# Patient Record
Sex: FEMALE | HISPANIC OR LATINO
[De-identification: names, ages, dates, MRNs, and addresses within clinical notes are randomized per-mention and may not be internally consistent; named-entity substitution may affect disease eponyms.]

---

## 2018-01-08 ENCOUNTER — FORM ENCOUNTER (OUTPATIENT)
Age: 58
End: 2018-01-08

## 2018-01-24 ENCOUNTER — FORM ENCOUNTER (OUTPATIENT)
Age: 58
End: 2018-01-24

## 2019-01-08 ENCOUNTER — FORM ENCOUNTER (OUTPATIENT)
Age: 59
End: 2019-01-08

## 2019-02-27 ENCOUNTER — FORM ENCOUNTER (OUTPATIENT)
Age: 59
End: 2019-02-27

## 2019-11-02 ENCOUNTER — FORM ENCOUNTER (OUTPATIENT)
Age: 59
End: 2019-11-02

## 2020-01-27 ENCOUNTER — FORM ENCOUNTER (OUTPATIENT)
Age: 60
End: 2020-01-27

## 2021-02-15 PROBLEM — L80 VITILIGO: Status: RESOLVED | Noted: 2021-02-15 | Resolved: 2021-02-15

## 2021-02-15 PROBLEM — Z86.39 HISTORY OF HYPERCHOLESTEROLEMIA: Status: RESOLVED | Noted: 2021-02-15 | Resolved: 2021-02-15

## 2021-02-15 PROBLEM — Z87.09 HISTORY OF ASTHMA: Status: RESOLVED | Noted: 2021-02-15 | Resolved: 2021-02-15

## 2021-02-15 PROBLEM — Z80.3 FAMILY HISTORY OF BREAST CANCER: Status: ACTIVE | Noted: 2021-02-15

## 2021-02-15 PROBLEM — Z87.19 HISTORY OF GASTROESOPHAGEAL REFLUX (GERD): Status: RESOLVED | Noted: 2021-02-15 | Resolved: 2021-02-15

## 2021-02-16 ENCOUNTER — APPOINTMENT (OUTPATIENT)
Dept: BREAST CENTER | Facility: CLINIC | Age: 61
End: 2021-02-16
Payer: COMMERCIAL

## 2021-02-16 VITALS
HEART RATE: 61 BPM | DIASTOLIC BLOOD PRESSURE: 82 MMHG | BODY MASS INDEX: 34.31 KG/M2 | WEIGHT: 201 LBS | SYSTOLIC BLOOD PRESSURE: 149 MMHG | HEIGHT: 64 IN

## 2021-02-16 DIAGNOSIS — Z80.3 FAMILY HISTORY OF MALIGNANT NEOPLASM OF BREAST: ICD-10-CM

## 2021-02-16 DIAGNOSIS — Z87.09 PERSONAL HISTORY OF OTHER DISEASES OF THE RESPIRATORY SYSTEM: ICD-10-CM

## 2021-02-16 DIAGNOSIS — Z78.9 OTHER SPECIFIED HEALTH STATUS: ICD-10-CM

## 2021-02-16 DIAGNOSIS — N64.9 DISORDER OF BREAST, UNSPECIFIED: ICD-10-CM

## 2021-02-16 DIAGNOSIS — L80 VITILIGO: ICD-10-CM

## 2021-02-16 DIAGNOSIS — Z86.39 PERSONAL HISTORY OF OTHER ENDOCRINE, NUTRITIONAL AND METABOLIC DISEASE: ICD-10-CM

## 2021-02-16 DIAGNOSIS — Z87.19 PERSONAL HISTORY OF OTHER DISEASES OF THE DIGESTIVE SYSTEM: ICD-10-CM

## 2021-02-16 PROCEDURE — 99213 OFFICE O/P EST LOW 20 MIN: CPT

## 2021-02-16 PROCEDURE — 99072 ADDL SUPL MATRL&STAF TM PHE: CPT

## 2021-02-17 ENCOUNTER — NON-APPOINTMENT (OUTPATIENT)
Age: 61
End: 2021-02-17

## 2021-02-17 PROBLEM — N64.9 SKIN LESION OF BREAST: Status: ACTIVE | Noted: 2021-02-17

## 2021-02-17 PROBLEM — Z78.9 CURRENT NON-DRINKER OF ALCOHOL: Status: ACTIVE | Noted: 2021-02-16

## 2021-02-17 RX ORDER — ALPRAZOLAM 0.5 MG/1
0.5 TABLET ORAL
Qty: 45 | Refills: 0 | Status: ACTIVE | COMMUNITY
Start: 2021-01-29

## 2021-02-17 RX ORDER — CLOBETASOL PROPIONATE 0.5 MG/G
0.05 CREAM TOPICAL
Qty: 15 | Refills: 0 | Status: ACTIVE | COMMUNITY
Start: 2020-11-25

## 2021-02-17 RX ORDER — CYCLOBENZAPRINE HYDROCHLORIDE 5 MG/1
5 TABLET, FILM COATED ORAL
Qty: 30 | Refills: 0 | Status: ACTIVE | COMMUNITY
Start: 2021-01-29

## 2021-02-17 RX ORDER — PANTOPRAZOLE 40 MG/1
40 TABLET, DELAYED RELEASE ORAL
Qty: 90 | Refills: 0 | Status: ACTIVE | COMMUNITY
Start: 2020-06-01

## 2021-02-17 RX ORDER — ALBUTEROL SULFATE 90 UG/1
108 (90 BASE) INHALANT RESPIRATORY (INHALATION)
Qty: 8 | Refills: 0 | Status: ACTIVE | COMMUNITY
Start: 2021-01-18

## 2021-02-17 RX ORDER — HYDROCORTISONE 0.5 G/100G
0.5 CREAM TOPICAL 3 TIMES DAILY
Qty: 1 | Refills: 0 | Status: ACTIVE | COMMUNITY
Start: 2021-02-17 | End: 1900-01-01

## 2021-02-17 RX ORDER — OLMESARTAN MEDOXOMIL 40 MG/1
40 TABLET, FILM COATED ORAL
Qty: 90 | Refills: 0 | Status: ACTIVE | COMMUNITY
Start: 2020-09-28

## 2021-02-17 RX ORDER — TIOTROPIUM BROMIDE INHALATION SPRAY 3.12 UG/1
2.5 SPRAY, METERED RESPIRATORY (INHALATION)
Qty: 4 | Refills: 0 | Status: ACTIVE | COMMUNITY
Start: 2021-01-18

## 2021-02-17 RX ORDER — SIMVASTATIN 20 MG/1
20 TABLET, FILM COATED ORAL
Qty: 90 | Refills: 0 | Status: ACTIVE | COMMUNITY
Start: 2020-12-22

## 2021-02-23 NOTE — PHYSICAL EXAM
[Supple] : supple [No Supraclavicular Adenopathy] : no supraclavicular adenopathy [No Cervical Adenopathy] : no cervical adenopathy [Examined in the supine and seated position] : examined in the supine and seated position [No dominant masses] : no dominant masses in right breast  [No dominant masses] : no dominant masses left breast [No Nipple Retraction] : no right nipple retraction [No Nipple Discharge] : no right nipple discharge [No Axillary Lymphadenopathy] : no left axillary lymphadenopathy [de-identified] : areolar Cho gland inflammation which patient is applying Bacitracin to with no relief [de-identified] : well healed L TM w/ TRAM reconstruction

## 2021-02-23 NOTE — PAST MEDICAL HISTORY
[Menarche Age ____] : age at menarche was [unfilled] [Menopause Age____] : age at menopause was [unfilled] [Total Preg ___] : G[unfilled] [Live Births ___] : P[unfilled]  [Age At Live Birth ___] : Age at live birth: [unfilled] [de-identified] : menopause d/t chemo [FreeTextEntry6] : NO [FreeTextEntry7] : YES  [FreeTextEntry8] : YES

## 2021-02-23 NOTE — HISTORY OF PRESENT ILLNESS
[FreeTextEntry1] : 61yo female previously seen by Dr. 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 Gaines. Patient presents for breast cancer surveillance and denies palpable masses, skin changes, or nipple discharge.\par \par \par

## 2021-02-23 NOTE — DATA REVIEWED
[FreeTextEntry1] : 1/9/18: R MG: heterogeneously dense, post-op changes and three tissue markers, stable benign calcs. BIRADS 2.\par \par 1/9/19: R MG: heterogeneously dense, few stable benign calcs, three tissue markers in place. BIRADS 2. \par \par 1/28/20: R MG: heterogeneously dense, post-op changes, three tissue markers in place, scattered benign calcs. BIRADS 2. \par \par 2/16/20: R MG: heterogeneously dense, R - post-op changes UOQ, numerous benign scattered microcalcs as well as two clusters of microcalcs UOQ 12:00 - both clusters are adjacent to tissue markers and have not increased compared to prior studies but have coarsened and appear slightly different. BIRADS 2 - 6mo f/u RDXMG rec

## 2021-08-16 DIAGNOSIS — N63.0 UNSPECIFIED LUMP IN UNSPECIFIED BREAST: ICD-10-CM

## 2021-08-16 DIAGNOSIS — R92.1 MAMMOGRAPHIC CALCIFICATION FOUND ON DIAGNOSTIC IMAGING OF BREAST: ICD-10-CM

## 2021-08-16 PROBLEM — Z12.39 SCREENING BREAST EXAMINATION: Status: ACTIVE | Noted: 2021-08-16

## 2021-08-16 PROBLEM — N64.9 DISORDER OF BREAST, UNSPECIFIED: Status: ACTIVE | Noted: 2021-08-16

## 2021-08-16 PROBLEM — Z85.3 HISTORY OF MALIGNANT NEOPLASM OF FEMALE BREAST: Status: RESOLVED | Noted: 2021-08-16 | Resolved: 2021-08-16

## 2021-08-17 ENCOUNTER — APPOINTMENT (OUTPATIENT)
Dept: BREAST CENTER | Facility: CLINIC | Age: 61
End: 2021-08-17
Payer: COMMERCIAL

## 2021-08-17 VITALS
DIASTOLIC BLOOD PRESSURE: 80 MMHG | SYSTOLIC BLOOD PRESSURE: 134 MMHG | WEIGHT: 202 LBS | HEART RATE: 54 BPM | BODY MASS INDEX: 34.49 KG/M2 | HEIGHT: 64 IN | OXYGEN SATURATION: 96 %

## 2021-08-17 DIAGNOSIS — Z00.00 ENCOUNTER FOR GENERAL ADULT MEDICAL EXAMINATION W/OUT ABNORMAL FINDINGS: ICD-10-CM

## 2021-08-17 DIAGNOSIS — Z85.3 PERSONAL HISTORY OF MALIGNANT NEOPLASM OF BREAST: ICD-10-CM

## 2021-08-17 DIAGNOSIS — N64.9 DISORDER OF BREAST, UNSPECIFIED: ICD-10-CM

## 2021-08-17 DIAGNOSIS — Z12.39 ENCOUNTER FOR OTHER SCREENING FOR MALIGNANT NEOPLASM OF BREAST: ICD-10-CM

## 2021-08-17 PROCEDURE — 99213 OFFICE O/P EST LOW 20 MIN: CPT

## 2021-08-18 PROBLEM — Z00.00 ENCOUNTER FOR PREVENTIVE HEALTH EXAMINATION: Status: ACTIVE | Noted: 2021-01-07

## 2021-08-22 NOTE — ASSESSMENT
[FreeTextEntry1] : 61yo female (former pt of Dr. 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 Gaines. Her most recent R mammo/US (8/17/21) is negative with regular breast exam.

## 2021-08-22 NOTE — PAST MEDICAL HISTORY
[Menarche Age ____] : age at menarche was [unfilled] [Menopause Age____] : age at menopause was [unfilled] [Total Preg ___] : G[unfilled] [Live Births ___] : P[unfilled]  [Age At Live Birth ___] : Age at live birth: [unfilled] [de-identified] : menopause d/t chemo [FreeTextEntry6] : NO [FreeTextEntry7] : YES  [FreeTextEntry8] : YES

## 2021-08-22 NOTE — PHYSICAL EXAM
[Supple] : supple [No Supraclavicular Adenopathy] : no supraclavicular adenopathy [No Cervical Adenopathy] : no cervical adenopathy [Examined in the supine and seated position] : examined in the supine and seated position [No dominant masses] : no dominant masses in right breast  [No dominant masses] : no dominant masses left breast [No Nipple Retraction] : no right nipple retraction [No Nipple Discharge] : no right nipple discharge [No Axillary Lymphadenopathy] : no right axillary lymphadenopathy [de-identified] : well healed L TM w/ TRAM reconstruction

## 2021-08-22 NOTE — HISTORY OF PRESENT ILLNESS
[FreeTextEntry1] : 61yo female (former pt of Dr. 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 Gaines. Her most recent R mammo (2/16/21) is BI-RADS 2 for slightly diff appearing microcalcs. Her most recent right diag mammo/US was negative with benign cysts and recommend MRI due to her premenopausal CA. \par \par Patient presents for breast cancer surveillance and denies palpable masses, skin changes, or nipple discharge.\par \par \par

## 2021-08-22 NOTE — DATA REVIEWED
[FreeTextEntry1] : 1/9/18: R MG: heterogeneously dense, post-op changes and three tissue markers, stable benign calcs. BIRADS 2.\par \par 1/9/19: R MG: heterogeneously dense, few stable benign calcs, three tissue markers in place. BIRADS 2. \par \par 1/28/20: R MG: heterogeneously dense, post-op changes, three tissue markers in place, scattered benign calcs. BIRADS 2. \par \par 2/16/20: R MG: heterogeneously dense, R - post-op changes UOQ, numerous benign scattered microcalcs as well as two clusters of microcalcs UOQ 12:00 - both clusters are adjacent to tissue markers and have not increased compared to prior studies but have coarsened and appear slightly different. BIRADS 2 - 6mo f/u RDXMG rec\par \par 2/16/21: R screening mammo (BI-RADS 2) microcalcs appear slightly diff but more numerous- R diag mammo in 6 months \par \par 8/17/21 ([facility]) R diag mammo/US BI-RADS 2- two 3mm cysts with post surgical architectural distortion (hetero dense)

## 2021-08-31 ENCOUNTER — TRANSCRIPTION ENCOUNTER (OUTPATIENT)
Age: 61
End: 2021-08-31

## 2021-09-01 ENCOUNTER — NON-APPOINTMENT (OUTPATIENT)
Age: 61
End: 2021-09-01

## 2022-07-25 ENCOUNTER — NON-APPOINTMENT (OUTPATIENT)
Age: 62
End: 2022-07-25

## 2022-08-02 ENCOUNTER — APPOINTMENT (OUTPATIENT)
Dept: BREAST CENTER | Facility: CLINIC | Age: 62
End: 2022-08-02

## 2022-08-02 VITALS
HEIGHT: 64 IN | BODY MASS INDEX: 35.06 KG/M2 | WEIGHT: 205.38 LBS | SYSTOLIC BLOOD PRESSURE: 151 MMHG | DIASTOLIC BLOOD PRESSURE: 84 MMHG | HEART RATE: 60 BPM

## 2022-08-02 DIAGNOSIS — Z91.89 OTHER SPECIFIED PERSONAL RISK FACTORS, NOT ELSEWHERE CLASSIFIED: ICD-10-CM

## 2022-08-02 DIAGNOSIS — Z78.9 OTHER SPECIFIED HEALTH STATUS: ICD-10-CM

## 2022-08-02 PROCEDURE — 99213 OFFICE O/P EST LOW 20 MIN: CPT

## 2022-08-10 NOTE — ASSESSMENT
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 Gaines. Her  right diag mammo/US was negative with benign cysts and recommend MRI due to her premenopausal CA with recurrence, MRI 8/26/21 BIRADS 2. \par \par Patient with [facility] in 2018 negative for pathogenic mutations, VUS in NF1. Copy of report provided to patient per patient request. Additionally, enrolled patient in [employer] study, consent obtained and a copy given to patient.\par \par Patient without complaint. Physical exam WNL. Reviewed most recent imaging, R sMMG/US 8/2/22, BIRADS-2 Plan for MRI and re-examination with AGUSTO Elder or AGUSTO Angelo in 6 months. At that time, if imaging and exam benign, plan for R sMMG/US and reexamination 6 months later. Patient verbalizes understanding and is in agreement with the plan. \par

## 2022-08-10 NOTE — DATA REVIEWED
[FreeTextEntry1] : 1/9/18: R MG: heterogeneously dense, post-op changes and three tissue markers, stable benign calcs. BIRADS 2.\par \par 1/9/19: R MG: heterogeneously dense, few stable benign calcs, three tissue markers in place. BIRADS 2. \par \par 1/28/20: R MG: heterogeneously dense, post-op changes, three tissue markers in place, scattered benign calcs. BIRADS 2. \par \par 2/16/20: R MG: heterogeneously dense, R - post-op changes UOQ, numerous benign scattered microcalcs as well as two clusters of microcalcs UOQ 12:00 - both clusters are adjacent to tissue markers and have not increased compared to prior studies but have coarsened and appear slightly different. BIRADS 2 - 6mo f/u RDXMG rec\par \par 2/16/21: R screening mammo (BI-RADS 2) microcalcs appear slightly diff but more numerous- R diag mammo in 6 months \par \par 8/17/21 ([facility]) R diag mammo/US BI-RADS 2- two 3mm cysts with post surgical architectural distortion (hetero dense)\par \par 8/26/22 B/L MRI [facility]): No MR evidence of malignancy bilaterally in this patient status post left breast with TRAM flap reconstruction. Follow up annual screening. BIRADS 2 - Benign. \par \par 8/2/22 ([facility]) R sMMG/R US: heterogeneously dense. There is stable postoperative spiculated architectural distortion in the upper outer right breast. There are stable clusters of benign microcalcifications, two of the clusters are marked by clips. The scar is noted as well as two tiny cysts measuring 4 mm and 3 mm cyst. No mammographic or US evidence of malignancy. FOLLOW-UP:  Annual screening. BI-RADS 2:  Benign.

## 2022-08-10 NOTE — HISTORY OF PRESENT ILLNESS
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 Gaines s/p chemo in [place], [place] with Dr. Monteiro. Her right diag mammo/US in 8/17/21 was negative with benign cysts and recommend MRI due to her premenopausal CA with recurrence, MRI 8/26/21 BIRADS 2. Most recent imaging, R sMMG/US performed today, BIRADS-2. Denies palpable masses, skin changes, or nipple discharge. Of note, former pt of Dr. Alcazar.\par \par Patient with [facility] in 2018 negative for pathogenic mutations, VUS in NF1. \par \par \par \par

## 2022-08-10 NOTE — PAST MEDICAL HISTORY
[History of Hormone Replacement Treatment] : has no history of hormone replacement treatment [de-identified] : menopause d/t chemo [FreeTextEntry6] : NO [FreeTextEntry7] : YES  [FreeTextEntry8] : YES

## 2023-02-03 PROBLEM — Z08 ENCOUNTER FOR FOLLOW-UP SURVEILLANCE OF BREAST CANCER: Status: ACTIVE | Noted: 2022-07-25

## 2023-02-03 PROBLEM — Z85.3 PERSONAL HISTORY OF BREAST CANCER: Status: ACTIVE | Noted: 2021-02-15

## 2023-02-06 ENCOUNTER — APPOINTMENT (OUTPATIENT)
Dept: BREAST CENTER | Facility: CLINIC | Age: 63
End: 2023-02-06
Payer: COMMERCIAL

## 2023-02-06 VITALS
HEART RATE: 65 BPM | BODY MASS INDEX: 35 KG/M2 | SYSTOLIC BLOOD PRESSURE: 123 MMHG | WEIGHT: 205 LBS | DIASTOLIC BLOOD PRESSURE: 74 MMHG | HEIGHT: 64 IN

## 2023-02-06 DIAGNOSIS — Z85.3 PERSONAL HISTORY OF MALIGNANT NEOPLASM OF BREAST: ICD-10-CM

## 2023-02-06 DIAGNOSIS — Z08 ENCOUNTER FOR FOLLOW-UP EXAMINATION AFTER COMPLETED TREATMENT FOR MALIGNANT NEOPLASM: ICD-10-CM

## 2023-02-06 DIAGNOSIS — Z85.3 ENCOUNTER FOR FOLLOW-UP EXAMINATION AFTER COMPLETED TREATMENT FOR MALIGNANT NEOPLASM: ICD-10-CM

## 2023-02-06 PROCEDURE — 99213 OFFICE O/P EST LOW 20 MIN: CPT

## 2023-02-06 NOTE — PHYSICAL EXAM
[Supple] : supple [No Supraclavicular Adenopathy] : no supraclavicular adenopathy [No Cervical Adenopathy] : no cervical adenopathy [Examined in the supine and seated position] : examined in the supine and seated position [No dominant masses] : no dominant masses in right breast  [No dominant masses] : no dominant masses left breast [No Nipple Retraction] : no right nipple retraction [No Nipple Discharge] : no right nipple discharge [No Axillary Lymphadenopathy] : no left axillary lymphadenopathy [de-identified] : well healed L TM w/ TRAM reconstruction

## 2023-02-06 NOTE — DATA REVIEWED
[FreeTextEntry1] : 1/9/18: R MG: heterogeneously dense, post-op changes and three tissue markers, stable benign calcs. BIRADS 2.\par \par 1/9/19: R MG: heterogeneously dense, few stable benign calcs, three tissue markers in place. BIRADS 2. \par \par 1/28/20: R MG: heterogeneously dense, post-op changes, three tissue markers in place, scattered benign calcs. BIRADS 2. \par \par 2/16/20: R MG: heterogeneously dense, R - post-op changes UOQ, numerous benign scattered microcalcs as well as two clusters of microcalcs UOQ 12:00 - both clusters are adjacent to tissue markers and have not increased compared to prior studies but have coarsened and appear slightly different. BIRADS 2 - 6mo f/u RDXMG rec\par \par 2/16/21: R screening mammo (BI-RADS 2) microcalcs appear slightly diff but more numerous- R diag mammo in 6 months \par \par 8/17/21 ([facility]) R diag mammo/US BI-RADS 2- two 3mm cysts with post surgical architectural distortion (hetero dense)\par \par 8/26/22 B/L MRI ([facility]): No MR evidence of malignancy bilaterally in this patient status post left breast with TRAM flap reconstruction. Follow up annual screening. BIRADS 2 - Benign. \par \par 8/2/22 (R) R sMMG/R US: heterogeneously dense. There is stable postoperative spiculated architectural distortion in the upper outer right breast. There are stable clusters of benign microcalcifications, two of the clusters are marked by clips. The scar is noted as well as two tiny cysts measuring 4 mm and 3 mm cyst. No mammographic or US evidence of malignancy. FOLLOW-UP:  Annual screening. BI-RADS 2:  Benign. \par \par 2/6/23 (R) B/L MRI: \par LEFT BREAST: The patient is status post left mastectomy with TRAM flap reconstruction. RIGHT BREAST: There are abundant enhancing foci throughout the right breast. There are no enhancing masses nor areas of non-mass enhancement to suggest malignancy. There is non-enhancing postoperative architectural distortion in the right UOQ. Tissue markers are seen centrally and laterally in the right breast. No MR evidence of malignancy.  FOLLOW-UP:  Annual screening. BI-RADS Category 2:  Benign.

## 2023-02-06 NOTE — ASSESSMENT
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 Gaines. [facility] in 2018 negative for pathogenic mutations, VUS in NF1.\par \par Patient without complaint. Physical exam WNL. Reviewed most recent imaging, B/L MRI BIRADS-2. Plan for R sMMG/US and reexamination in 6 months. Patient verbalizes understanding and is in agreement with the plan. \par

## 2023-02-06 NOTE — HISTORY OF PRESENT ILLNESS
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 Gaines s/p chemo in [place], [place] with Dr. Monteiro. Of note, former pt of Dr. Alcazar.\par \par R sMMG/US 8/2/22, BIRADS-2. B/L MRI performed today, BIRADS-2.  \par Denies palpable masses, skin changes, or nipple discharge. \par \par Patient with [facility] in 2018 negative for pathogenic mutations, VUS in NF1. Patient enrolled in [employer] research study.\par \par \par

## 2023-02-06 NOTE — PAST MEDICAL HISTORY
[Menarche Age ____] : age at menarche was [unfilled] [Menopause Age____] : age at menopause was [unfilled] [Total Preg ___] : G[unfilled] [Live Births ___] : P[unfilled]  [Age At Live Birth ___] : Age at live birth: [unfilled] [History of Hormone Replacement Treatment] : has no history of hormone replacement treatment [de-identified] : menopause d/t chemo [FreeTextEntry6] : NO [FreeTextEntry7] : YES  [FreeTextEntry8] : YES

## 2023-03-08 ENCOUNTER — NON-APPOINTMENT (OUTPATIENT)
Age: 63
End: 2023-03-08

## 2023-07-11 ENCOUNTER — APPOINTMENT (OUTPATIENT)
Dept: PAIN MANAGEMENT | Facility: CLINIC | Age: 63
End: 2023-07-11
Payer: COMMERCIAL

## 2023-07-11 VITALS
HEIGHT: 64 IN | OXYGEN SATURATION: 96 % | SYSTOLIC BLOOD PRESSURE: 155 MMHG | WEIGHT: 207 LBS | BODY MASS INDEX: 35.34 KG/M2 | HEART RATE: 54 BPM | DIASTOLIC BLOOD PRESSURE: 92 MMHG

## 2023-07-11 DIAGNOSIS — M79.10 MYALGIA, UNSPECIFIED SITE: ICD-10-CM

## 2023-07-11 DIAGNOSIS — G89.29 PAIN IN RIGHT SHOULDER: ICD-10-CM

## 2023-07-11 DIAGNOSIS — Z87.39 PERSONAL HISTORY OF OTHER DISEASES OF THE MUSCULOSKELETAL SYSTEM AND CONNECTIVE TISSUE: ICD-10-CM

## 2023-07-11 DIAGNOSIS — M54.16 RADICULOPATHY, LUMBAR REGION: ICD-10-CM

## 2023-07-11 DIAGNOSIS — M25.511 PAIN IN RIGHT SHOULDER: ICD-10-CM

## 2023-07-11 PROCEDURE — 99244 OFF/OP CNSLTJ NEW/EST MOD 40: CPT

## 2023-07-11 RX ORDER — ERYTHROMYCIN 5 MG/G
5 OINTMENT OPHTHALMIC
Qty: 4 | Refills: 0 | Status: DISCONTINUED | COMMUNITY
Start: 2020-09-24 | End: 2023-07-11

## 2023-07-11 RX ORDER — FLUTICASONE PROPIONATE 50 MCG
50 SPRAY, SUSPENSION NASAL
Refills: 0 | Status: ACTIVE | COMMUNITY

## 2023-07-11 RX ORDER — IBUPROFEN 600 MG/1
600 TABLET, FILM COATED ORAL
Refills: 0 | Status: ACTIVE | COMMUNITY

## 2023-07-11 RX ORDER — BUDESONIDE, GLYCOPYRROLATE, AND FORMOTEROL FUMARATE 160; 9; 4.8 UG/1; UG/1; UG/1
AEROSOL, METERED RESPIRATORY (INHALATION)
Refills: 0 | Status: ACTIVE | COMMUNITY

## 2023-07-11 RX ORDER — FLUTICASONE FUROATE AND VILANTEROL TRIFENATATE 200; 25 UG/1; UG/1
200-25 POWDER RESPIRATORY (INHALATION)
Qty: 60 | Refills: 0 | Status: DISCONTINUED | COMMUNITY
Start: 2021-01-19 | End: 2023-07-11

## 2023-07-11 RX ORDER — TACROLIMUS 1 MG/G
0.1 OINTMENT TOPICAL
Qty: 30 | Refills: 0 | Status: DISCONTINUED | COMMUNITY
Start: 2020-08-19 | End: 2023-07-11

## 2023-07-11 RX ORDER — PREDNISONE 20 MG/1
20 TABLET ORAL
Refills: 0 | Status: ACTIVE | COMMUNITY

## 2023-07-11 RX ORDER — EPINEPHRINE 0.3 MG/.3ML
0.3 INJECTION INTRAMUSCULAR
Refills: 0 | Status: ACTIVE | COMMUNITY

## 2023-07-11 NOTE — DATA REVIEWED
[FreeTextEntry1] : MRI Lumbar Spine (06/29/23):\par \par L4-L5: disc bulge with left annular fissure, no stenosis or foraminal stenosis.\par L5-S1: mild bilateral foraminal stenosis.

## 2023-07-11 NOTE — CONSULT LETTER
[Dear  ___] : Dear  [unfilled], [Consult Letter:] : I had the pleasure of evaluating your patient, [unfilled]. [Please see my note below.] : Please see my note below. [Consult Closing:] : Thank you very much for allowing me to participate in the care of this patient.  If you have any questions, please do not hesitate to contact me. [Sincerely,] : Sincerely, [FreeTextEntry3] : Angelo Hernandez MD\par

## 2023-07-11 NOTE — HISTORY OF PRESENT ILLNESS
[Back Pain] : back pain [7] : a current pain level of 7/10 [8] : an average pain level of 8/10 [10] : a maximum pain level of 10/10 [FreeTextEntry1] : 62 yof presents w/ severe low back pain. Pain began in her left low back over one month ago. Pain was severe. Then, pain began on her right low back when she went to bend over and  paper. Pain radiated down the right leg. Quality of life is impaired. There has been a severe exacerbation of the patient's chronic pain. She also has severe right shoulder pain. She brings imaging for review.  [Sharp] : sharp [Dull] : dull [Bending] : bending [Rest] : rest

## 2023-07-11 NOTE — PHYSICAL EXAM

## 2023-08-07 ENCOUNTER — APPOINTMENT (OUTPATIENT)
Dept: BREAST CENTER | Facility: CLINIC | Age: 63
End: 2023-08-07

## 2023-09-06 ENCOUNTER — NON-APPOINTMENT (OUTPATIENT)
Age: 63
End: 2023-09-06

## 2023-09-27 ENCOUNTER — APPOINTMENT (OUTPATIENT)
Dept: BREAST CENTER | Facility: CLINIC | Age: 63
End: 2023-09-27

## 2024-08-02 NOTE — HISTORY OF PRESENT ILLNESS
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 Gaines s/p chemo in [place], [place] with Dr. Monteiro. Of note, former pt of Dr. Alcazar.  Patient was recommended MRI due to her premenopausal CA. B/L MRI (2/6/23), BIRADS-2. Patient did not complete a high-risk MRI in 2024. Most recent imaging completed today -- R sMMG 8/6/24, BIRADS ?????????. Denies palpable masses, skin changes, or nipple discharge.   Patient with [facility] in 2018 negative for pathogenic mutations, VUS in NF1. Patient enrolled in [employer] research study.

## 2024-08-02 NOTE — PAST MEDICAL HISTORY
[Menarche Age ____] : age at menarche was [unfilled] [Menopause Age____] : age at menopause was [unfilled] [Total Preg ___] : G[unfilled] [Live Births ___] : P[unfilled]  [Age At Live Birth ___] : Age at live birth: [unfilled] [History of Hormone Replacement Treatment] : has no history of hormone replacement treatment [de-identified] : menopause d/t chemo [FreeTextEntry7] : YES  [FreeTextEntry6] : NO [FreeTextEntry8] : YES

## 2024-08-02 NOTE — PHYSICAL EXAM
[Supple] : supple [No Supraclavicular Adenopathy] : no supraclavicular adenopathy [No Cervical Adenopathy] : no cervical adenopathy [Examined in the supine and seated position] : examined in the supine and seated position [No dominant masses] : no dominant masses in right breast  [No dominant masses] : no dominant masses left breast [No Nipple Retraction] : no right nipple retraction [No Nipple Discharge] : no right nipple discharge [No Axillary Lymphadenopathy] : no left axillary lymphadenopathy [de-identified] : well healed L TM w/ TRAM reconstruction

## 2024-08-02 NOTE — ASSESSMENT
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 Gaines. [facility] in 2018 negative for pathogenic mutations, VUS in NF1.  Patient was recommended MRI due to her premenopausal CA. B/L MRI (2/6/23), BIRADS-2. Patient did not complete a high-risk MRI in 2024. Most recently, R sMMG 8/6/24, BIRADS ?????????.  Patient without complaint. Physical exam WNL. Reviewed most recent imaging, as above. Plan for B/L MRI in Feb 2025 ??????? and, if benign, R sMMG/US and reexamination in August 2025 ?????. Patient verbalizes understanding and is in agreement with the plan.

## 2024-08-06 ENCOUNTER — APPOINTMENT (OUTPATIENT)
Dept: BREAST CENTER | Facility: CLINIC | Age: 64
End: 2024-08-06

## 2024-08-06 PROBLEM — E11.9 DIABETES: Status: ACTIVE | Noted: 2024-08-06

## 2024-08-06 PROBLEM — Z84.89 FAMILY HISTORY OF BENIGN NEOPLASM OF BREAST: Status: ACTIVE | Noted: 2024-08-06

## 2024-08-06 PROBLEM — R92.8 ABNORMAL FINDING ON BREAST IMAGING: Status: ACTIVE | Noted: 2024-08-06

## 2024-08-06 PROCEDURE — 99213 OFFICE O/P EST LOW 20 MIN: CPT

## 2024-08-13 NOTE — PLAN
[TextEntry] : --B/L MRI asap  -2 site R stereotactic biopsy (already scheduled 9/10/24) -If benign, R DX MMG & RTC in Feb 2025

## 2024-08-13 NOTE — PAST MEDICAL HISTORY
[History of Hormone Replacement Treatment] : has no history of hormone replacement treatment [de-identified] : menopause d/t chemo [FreeTextEntry6] : NO [FreeTextEntry7] : YES  [FreeTextEntry8] : YES

## 2024-08-13 NOTE — PAST MEDICAL HISTORY
[History of Hormone Replacement Treatment] : has no history of hormone replacement treatment [de-identified] : menopause d/t chemo [FreeTextEntry6] : NO [FreeTextEntry7] : YES  [FreeTextEntry8] : YES

## 2024-08-13 NOTE — HISTORY OF PRESENT ILLNESS
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 Gaines s/p chemo in [place], [place] with Dr. Monteiro. Of note, former pt of Dr. Alcazar.  Patient was recommended MRI due to her premenopausal CA. B/L MRI (2/6/23), BIRADS-2. Patient did not complete a high-risk MRI in 2024.   Most recent imaging completed today -- R sMMG 8/6/24, BIRADS-4 revealed (1) suspicious increasing microcalcifications in the R UIQ at the 2:00 axis and at the R UOQ at the 11-12 o'clock axis, with recommendation for 2 site right breast stereotactic biopsies; (2) additional microcalcifications are noted in the right UOQ for which six-month follow-up is recommended; (3) patient is currently overdue for her annual high risk screening MRI scan given the patient's history of breast cancer at age 33. Patient states she has scheduled a biopsy on 9/10/24.  Denies palpable masses, skin changes, or nipple discharge.   Patient with [employer] in 2018 negative for pathogenic mutations, VUS in NF1. Patient enrolled in [employer] research study.

## 2024-08-13 NOTE — DATA REVIEWED
[FreeTextEntry1] : 1/9/18: R MG: heterogeneously dense, post-op changes and three tissue markers, stable benign calcs. BIRADS 2.  1/9/19: R MG: heterogeneously dense, few stable benign calcs, three tissue markers in place. BIRADS 2.   1/28/20: R MG: heterogeneously dense, post-op changes, three tissue markers in place, scattered benign calcs. BIRADS 2.   2/16/20: R MG: heterogeneously dense, R - post-op changes UOQ, numerous benign scattered microcalcs as well as two clusters of microcalcs UOQ 12:00 - both clusters are adjacent to tissue markers and have not increased compared to prior studies but have coarsened and appear slightly different. BIRADS 2 - 6mo f/u RDXMG rec  2/16/21: R screening mammo (BI-RADS 2) microcalcs appear slightly diff but more numerous- R diag mammo in 6 months   8/17/21 ([facility]) R diag mammo/US BI-RADS 2- two 3mm cysts with post surgical architectural distortion (hetero dense)  8/26/22 B/L MRI (R): No MR evidence of malignancy bilaterally in this patient status post left breast with TRAM flap reconstruction. Follow up annual screening. BIRADS 2 - Benign.   8/2/22 (R) R sMMG/R US: heterogeneously dense. There is stable postoperative spiculated architectural distortion in the upper outer right breast. There are stable clusters of benign microcalcifications, two of the clusters are marked by clips. The scar is noted as well as two tiny cysts measuring 4 mm and 3 mm cyst. No mammographic or US evidence of malignancy. FOLLOW-UP:  Annual screening. BI-RADS 2:  Benign.   2/6/23 (R) B/L MRI:  LEFT BREAST: The patient is status post left mastectomy with TRAM flap reconstruction. RIGHT BREAST: There are abundant enhancing foci throughout the right breast. There are no enhancing masses nor areas of non-mass enhancement to suggest malignancy. There is non-enhancing postoperative architectural distortion in the right UOQ. Tissue markers are seen centrally and laterally in the right breast. No MR evidence of malignancy.  FOLLOW-UP:  Annual screening. BI-RADS Category 2:  Benign.   8/4/23 ([facility]) R sMMG/R US: heterogeneously dense. Again noted in the right retroareolar region at 9 o'clock are 2 adjacent benign cysts measuring 0.3 x 0.2 x 0.3 cm and 0.3 x 0.4 x 0.2 cm. BI-RADS 2, benign. Follow Up: annual screening   8/6/24 (R) R sMMG: scattered areas of fbg density.  1. Suspicious findings: Suspicious increasing microcalcifications in the R UIQ at the 2:00 axis and at the R UOQ at the 11-12 o'clock axis. 2 site right breast stereotactic biopsies are recommended.  2. Additional microcalcifications are noted in the right UOQ for which six-month follow-up is recommended. 3. The patient is currently overdue for her annual high risk screening MRI scan (sees last MRI performed February 2023). Can consider the addition of annual supplemental MRI scan given the patient's history of breast cancer at age 33. FOLLOW-UP:   1. 2 site right breast stereotactic biopsies. 2. Six-month follow-up for additional right breast calcifications. 3. Can consider the addition of supplemental annual MRI scan at the discretion of the referring surgeon.  BI-RADS 4:  Suspicious.

## 2024-08-13 NOTE — ASSESSMENT
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 Gaines. [facility] in 2018 negative for pathogenic mutations, VUS in NF1.  Patient was recommended MRI due to her premenopausal CA. B/L MRI (2/6/23), BIRADS-2. Patient did not complete a high-risk MRI in 2024. Most recently, R sMMG 8/6/24, BIRADS-4 revealed (1) suspicious increasing microcalcifications in the R UIQ at the 2:00 axis and at the R UOQ at the 11-12 o'clock axis, with recommendation for 2 site right breast stereotactic biopsies; (2) additional microcalcifications are noted in the right UOQ for which six-month follow-up is recommended; (3) patient is currently overdue for her annual high risk screening MRI scan given the patient's history of breast cancer at age 33.  Answered all patients questions. Reviewed most recent imaging, as above. Plan for breast MRI asap, prior to her biopsy, and a 2 site R stereotactic biopsy (already scheduled on 9/10/24 -- will attempt to move this up). If benign, plan for R DX MMG and re-examination in 6 months. Patient verbalizes understanding and is in agreement with the plan.

## 2024-08-13 NOTE — HISTORY OF PRESENT ILLNESS
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 Gaines s/p chemo in [place], [place] with Dr. Monteiro. Of note, former pt of Dr. Alcazar.  Patient was recommended MRI due to her premenopausal CA. B/L MRI (2/6/23), BIRADS-2. Patient did not complete a high-risk MRI in 2024.   Most recent imaging completed today -- R sMMG 8/6/24, BIRADS-4 revealed (1) suspicious increasing microcalcifications in the R UIQ at the 2:00 axis and at the R UOQ at the 11-12 o'clock axis, with recommendation for 2 site right breast stereotactic biopsies; (2) additional microcalcifications are noted in the right UOQ for which six-month follow-up is recommended; (3) patient is currently overdue for her annual high risk screening MRI scan given the patient's history of breast cancer at age 33. Patient states she has scheduled a biopsy on 9/10/24.  Denies palpable masses, skin changes, or nipple discharge.   Patient with [employer] in 2018 negative for pathogenic mutations, VUS in NF1. Patient enrolled in [employer] research study.

## 2024-08-13 NOTE — ASSESSMENT
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 Gaines. [facility] in 2018 negative for pathogenic mutations, VUS in NF1.  Patient was recommended MRI due to her premenopausal CA. B/L MRI (2/6/23), BIRADS-2. Patient did not complete a high-risk MRI in 2024. Most recently, R sMMG 8/6/24, BIRADS-4 revealed (1) suspicious increasing microcalcifications in the R UIQ at the 2:00 axis and at the R UOQ at the 11-12 o'clock axis, with recommendation for 2 site right breast stereotactic biopsies; (2) additional microcalcifications are noted in the right UOQ for which six-month follow-up is recommended; (3) patient is currently overdue for her annual high risk screening MRI scan given the patient's history of breast cancer at age 33.  Answered all patients questions. Reviewed most recent imaging, as above. Plan for breast MRI asap, prior to her biopsy, and a 2 site R stereotactic biopsy (already scheduled on 9/10/24 -- will attempt to move this up). If benign, plan for R DX MMG and re-examination in 6 months. Patient verbalizes understanding and is in agreement with the plan.

## 2024-08-15 ENCOUNTER — NON-APPOINTMENT (OUTPATIENT)
Age: 64
End: 2024-08-15

## 2024-08-20 ENCOUNTER — RESULT REVIEW (OUTPATIENT)
Age: 64
End: 2024-08-20

## 2024-08-22 ENCOUNTER — NON-APPOINTMENT (OUTPATIENT)
Age: 64
End: 2024-08-22

## 2024-08-22 DIAGNOSIS — Z98.890 OTHER SPECIFIED POSTPROCEDURAL STATES: ICD-10-CM

## 2025-02-11 ENCOUNTER — APPOINTMENT (OUTPATIENT)
Dept: BREAST CENTER | Facility: CLINIC | Age: 65
End: 2025-02-11
Payer: MEDICARE

## 2025-02-11 VITALS
HEART RATE: 64 BPM | DIASTOLIC BLOOD PRESSURE: 78 MMHG | SYSTOLIC BLOOD PRESSURE: 130 MMHG | HEIGHT: 64 IN | BODY MASS INDEX: 31.58 KG/M2 | WEIGHT: 185 LBS

## 2025-02-11 DIAGNOSIS — Z91.89 OTHER SPECIFIED PERSONAL RISK FACTORS, NOT ELSEWHERE CLASSIFIED: ICD-10-CM

## 2025-02-11 DIAGNOSIS — Z98.890 OTHER SPECIFIED POSTPROCEDURAL STATES: ICD-10-CM

## 2025-02-11 DIAGNOSIS — Z08 ENCOUNTER FOR FOLLOW-UP EXAMINATION AFTER COMPLETED TREATMENT FOR MALIGNANT NEOPLASM: ICD-10-CM

## 2025-02-11 DIAGNOSIS — Z80.3 FAMILY HISTORY OF MALIGNANT NEOPLASM OF BREAST: ICD-10-CM

## 2025-02-11 DIAGNOSIS — Z85.3 ENCOUNTER FOR FOLLOW-UP EXAMINATION AFTER COMPLETED TREATMENT FOR MALIGNANT NEOPLASM: ICD-10-CM

## 2025-02-11 DIAGNOSIS — Z84.89 FAMILY HISTORY OF OTHER SPECIFIED CONDITIONS: ICD-10-CM

## 2025-02-11 DIAGNOSIS — Z85.3 PERSONAL HISTORY OF MALIGNANT NEOPLASM OF BREAST: ICD-10-CM

## 2025-02-11 PROCEDURE — 99203 OFFICE O/P NEW LOW 30 MIN: CPT

## 2025-02-11 RX ORDER — TOFACITINIB CITRATE 100 %
POWDER (GRAM) MISCELLANEOUS
Refills: 0 | Status: ACTIVE | COMMUNITY

## 2025-02-11 RX ORDER — METFORMIN ER 750 MG 750 MG/1
750 TABLET ORAL
Refills: 0 | Status: ACTIVE | COMMUNITY

## 2025-08-25 ENCOUNTER — RESULT REVIEW (OUTPATIENT)
Age: 65
End: 2025-08-25

## 2025-09-02 ENCOUNTER — NON-APPOINTMENT (OUTPATIENT)
Age: 65
End: 2025-09-02